# Patient Record
Sex: FEMALE | Race: WHITE | NOT HISPANIC OR LATINO | Employment: FULL TIME | URBAN - METROPOLITAN AREA
[De-identification: names, ages, dates, MRNs, and addresses within clinical notes are randomized per-mention and may not be internally consistent; named-entity substitution may affect disease eponyms.]

---

## 2020-12-16 ENCOUNTER — TELEMEDICINE (OUTPATIENT)
Dept: GASTROENTEROLOGY | Facility: CLINIC | Age: 25
End: 2020-12-16
Payer: COMMERCIAL

## 2020-12-16 VITALS — HEIGHT: 66 IN | WEIGHT: 183 LBS | BODY MASS INDEX: 29.41 KG/M2

## 2020-12-16 DIAGNOSIS — R19.7 DIARRHEA, UNSPECIFIED TYPE: ICD-10-CM

## 2020-12-16 DIAGNOSIS — K62.5 RECTAL BLEEDING: Primary | ICD-10-CM

## 2020-12-16 PROCEDURE — 99204 OFFICE O/P NEW MOD 45 MIN: CPT | Performed by: INTERNAL MEDICINE

## 2020-12-16 RX ORDER — NORETHINDRONE ACETATE AND ETHINYL ESTRADIOL 1; .02 MG/1; MG/1
1 TABLET ORAL DAILY
COMMUNITY
End: 2021-06-17 | Stop reason: SDUPTHER

## 2020-12-16 RX ORDER — SUMATRIPTAN 50 MG/1
50 TABLET, FILM COATED ORAL ONCE AS NEEDED
COMMUNITY

## 2020-12-16 RX ORDER — TOPIRAMATE 25 MG/1
25 CAPSULE, COATED PELLETS ORAL 2 TIMES DAILY
COMMUNITY

## 2020-12-16 RX ORDER — CLOTRIMAZOLE AND BETAMETHASONE DIPROPIONATE 10; .64 MG/G; MG/G
1 CREAM TOPICAL 2 TIMES DAILY
COMMUNITY

## 2020-12-16 RX ORDER — VALACYCLOVIR HYDROCHLORIDE 1 G/1
1000 TABLET, FILM COATED ORAL 2 TIMES DAILY
COMMUNITY
End: 2021-06-17 | Stop reason: SDUPTHER

## 2021-01-14 ENCOUNTER — OFFICE VISIT (OUTPATIENT)
Dept: GASTROENTEROLOGY | Facility: CLINIC | Age: 26
End: 2021-01-14
Payer: COMMERCIAL

## 2021-01-14 VITALS
SYSTOLIC BLOOD PRESSURE: 136 MMHG | HEIGHT: 66 IN | BODY MASS INDEX: 29.77 KG/M2 | HEART RATE: 84 BPM | DIASTOLIC BLOOD PRESSURE: 86 MMHG | WEIGHT: 185.2 LBS

## 2021-01-14 DIAGNOSIS — K62.5 RECTAL BLEEDING: ICD-10-CM

## 2021-01-14 DIAGNOSIS — R19.7 DIARRHEA, UNSPECIFIED TYPE: Primary | ICD-10-CM

## 2021-01-14 PROCEDURE — 99213 OFFICE O/P EST LOW 20 MIN: CPT | Performed by: NURSE PRACTITIONER

## 2021-01-14 NOTE — PATIENT INSTRUCTIONS
Keep colonoscopy appointment scheduled for 2/12  Low fiber/low residue diet for now   Trial fiber gummies (start with 1 daily and then increase to 2 daily     Follow up in 6-8 weeks     Low Fiber Diet   WHAT YOU NEED TO KNOW:   A low-fiber diet limits foods that are high in fiber  Fiber is the part of fruits, vegetables, and grains that is not broken down by your body  You may need to follow this diet after surgery on your intestines  You may also need to follow this diet for certain conditions such as Crohn disease or ulcerative colitis  Ask your healthcare provider or dietitian how much fiber you can have each day  DISCHARGE INSTRUCTIONS:   Foods to include:  Read food labels to check the amount of fiber that are found in foods  Some foods that are low in fiber include the following:  · Grains:  Choose grains that have less than 2 grams of fiber in each serving  Examples include the following:     ? Cream of wheat and finely ground grits    ? Dry cereal made from rice     ? White bread, white pasta, and white rice    ? Crackers, bagels, and rolls made from white or refined flour    · Other foods:      ? Canned and well-cooked fruit without skins or seeds, and juice without pulp    ? Ripe bananas and melons    ? Canned and well-cooked vegetables without skins or seeds, and vegetable juice    ? Cow's milk, lactose-free milk, soy milk, and rice milk    ? Yogurt without nuts, fruit, or granola    ? Eggs, poultry (such as chicken and turkey), fish, and tender, ground, well-cooked beef     ? Tofu and smooth peanut butter    ?  Broth and strained soups made of low-fiber foods    Foods to avoid:   · Breads, cereals, crackers, and pasta made with whole wheat or whole grains (such as whole oats)    · Cro[place] [employer] [place], wild rice, quinoa, kasha, and barley    · All fresh fruit with skin, except banana and melons     · Dried fruits and fruit juice with pulp    · Canned pineapple    · Raw vegetables    · Nuts, seeds, and popcorn    · Beans, nuts, peas, and lentils    · Tough meats    · Coconut and avocado    What else you should know about a low-fiber diet:  A low-fiber diet can decrease the amount of bowel movements you have  Drink liquids as directed to avoid constipation  Ask how much liquid to drink each day and which liquids are best for you  © Copyright [address] Information is for End User's use only and may not be sold, redistributed or otherwise used for commercial purposes  All illustrations and images included in CareNotes® are the copyrighted property of [employer]  or [address] [place] [address]  The above information is an  only  It is not intended as medical advice for individual conditions or treatments  Talk to your doctor, nurse or pharmacist before following any medical regimen to see if it is safe and effective for you     if able to tolerate)

## 2021-01-14 NOTE — H&P (VIEW-ONLY)
[id number] - Outpatient Follow-up Note  Mark Andino 31 y o  female MRN: [id number]  Encounter: [id number]    ASSESSMENT AND PLAN:      1  Diarrhea, unspecified type  2  Rectal bleeding  Patient reports an improvement of diarrhea and rectal bleeding  She did have 1 episode of diarrhea over the weekend but reports that she has had intermittent constipation  Associated with occasional bloating and mild lower abdominal pain  Differential diagnoses include colitis, hemorrhoidal bleeding, IBD  - proceed with colonoscopy as scheduled on 02/12/2021  - low-fiber, low residue/bland diet for now  - stay well hydrated  - can trial fiber gummies 1-2 daily     Followup Appointment: 2 months   ______________________________________________________________________    Chief Complaint   Patient presents with    Follow up diarrhea     HPI:  Mark Andino is a 31y o  year old female who returns to the office today for follow-up of her diarrhea and rectal bleeding  Patient was last seen for virtual visit in the middle of December after an acute onset of diarrhea and rectal bleeding  Patient reports that she had multiple episodes of diarrhea, lower abdominal pain with clots  She notes that since that time she has only experienced 1 episode of diarrhea that occurred over this past weekend  She denies any further episodes of rectal bleeding  She notes that she is moving her bowels every other day and reports that occasionally they are soft and formed but will also sometimes be hard in consistency  Associated with mild lower abdominal pain and cramping and occasional intermittent bloating  She denies any fever, chills, upper GI symptoms, nausea, vomiting, hematochezia or melena  Her appetite and weight are stable  Historical Information   Past Medical History:   Diagnosis Date    Irritable bowel syndrome     Migraines      History reviewed   No pertinent surgical history  Social History     Substance and Sexual Activity   Alcohol Use Yes    Frequency: 2-4 times a month    Comment: social     Social History     Substance and Sexual Activity   Drug Use Not on file     Social History     Tobacco Use   Smoking Status Never Smoker   Smokeless Tobacco Never Used     Family History   Problem Relation Age of Onset    Hypertension Mother     Melanoma Mother     Cancer Maternal Grandfather     Cancer Maternal Aunt     Colon polyps Maternal Aunt 28        polyposis; 68 polyps removed; hx adenomatous polyps    Cancer Other 82        rectal cancer    Colon cancer Other 65         Current Outpatient Medications:     clotrimazole-betamethasone (LOTRISONE) 1-0 05 % cream    norethindrone-ethinyl estradiol (MICROGESTIN 1/20) 1-20 MG-MCG per tablet    SUMAtriptan (IMITREX) 50 mg tablet    topiramate (TOPAMAX) 25 mg sprinkle capsule    valACYclovir (VALTREX) 1,000 mg tablet  No Known Allergies  Reviewed medications and allergies and updated as indicated    PHYSICAL EXAM:    Blood pressure 136/86, pulse 84, height 5' 6" (1 676 m), weight 84 kg (185 lb 3 2 oz)  Body mass index is 29 89 kg/m²  General Appearance: NAD, cooperative, alert  Eyes: Anicteric, PERRLA, EOMI  ENT:  Normocephalic, atraumatic, normal mucosa  Neck:  Supple, symmetrical, trachea midline  Resp:  Clear to auscultation bilaterally; no rales, rhonchi or wheezing; respirations unlabored   CV:  S1 S2, Regular rate and rhythm; no murmur, rub, or gallop  GI:  Soft, +right and left lower quadrant abdominal ttp, no guarding or rebound, normal bowel sounds; no masses, no organomegaly   Rectal: Deferred  Musculoskeletal: No cyanosis, clubbing or edema  Normal ROM    Skin:  No jaundice, rashes, or lesions   Heme/Lymph: No palpable cervical lymphadenopathy  Psych: Normal affect, good eye contact  Neuro: No gross deficits, AAOx3    Lab Results:   No results found for: WBC, HGB, HCT, MCV, PLT  No results found for: NA, K, CL, CO2, ANIONGAP, BUN, CREATININE, GLUCOSE, GLUF, CALCIUM, CORRECTEDCA, AST, ALT, ALKPHOS, PROT, BILITOT, EGFR  No results found for: IRON, TIBC, FERRITIN  No results found for: LIPASE    Radiology Results:   No results found

## 2021-02-05 ENCOUNTER — TELEMEDICINE (OUTPATIENT)
Dept: GASTROENTEROLOGY | Facility: CLINIC | Age: 26
End: 2021-02-05

## 2021-02-05 VITALS — BODY MASS INDEX: 28.93 KG/M2 | WEIGHT: 180 LBS | HEIGHT: 66 IN

## 2021-02-05 DIAGNOSIS — R19.7 DIARRHEA, UNSPECIFIED TYPE: ICD-10-CM

## 2021-02-05 DIAGNOSIS — K62.5 RECTAL BLEEDING: Primary | ICD-10-CM

## 2021-02-05 RX ORDER — SODIUM PICOSULFATE, MAGNESIUM OXIDE, AND ANHYDROUS CITRIC ACID 10; 3.5; 12 MG/160ML; G/160ML; G/160ML
LIQUID ORAL
Qty: 1 BOTTLE | Refills: 0 | Status: SHIPPED | OUTPATIENT
Start: 2021-02-05

## 2021-02-05 NOTE — PROGRESS NOTES
Why does your doctor want you to have this procedure? Rectal bleeding, diarrhea, fam  Hx of polyps    Do you have kidney disease?  no  If yes, are you on dialysis :     Have you had diverticulitis within the past 2 months? no    Are you diabetic?  no  If yes, insulin dependent: If yes, provide diabetic instructions sheet     Do take iron supplements?  no  If yes, instruct patient to hold iron supplement for 7 days prior    Are you on a blood thinner? no   Was the blood thinner sheet complete and faxed to cardiologist no  Plavix (clopidogrel), Coumadin (warfarin), Lovenox (enoxaparin), Xarelto (rivaroxaban), Pradaxa(dabigatran), Eliquis(apixaban) Savaysa/Lixiana (edoxapan)    Do you have an automatic implantable cardiac defibrillator (AICD)/pacemaker ([facility])? no  Was AICD/pacemaker sheet completed and faxed to cardiologist? no    Are you on home oxygen? no  If yes, continuous or nocturnal:     Have you been treated for MRSA, VRE or any communicable diseases? no    Heart attack, stroke, or stent within 3 months? no  Schedule at Hospital if within 3-6 months   Use nitroglycerin for chest pain in the last 6 months? no    History of organ  transplant?  no   If yes, notify Endo      History of neck/throat/tongue surgery or cancer? no  IF yes, notify Endo      Any problems with anesthesia in the past? no     Was stool C diff ordered?  no Stool specimen needs to be completed prior to procedure    Do have any facial or body piercings?no     Do you have a latex allergy? no     Do have an allergy to metals? (Bravo study only) no     If pediatric patient, was consent faxed to pediatrician no     Patient rights reviewed yes    Rx Clenpiq sent to provider for signature  Instructions emailed to patient

## 2021-02-12 ENCOUNTER — ANESTHESIA (OUTPATIENT)
Dept: GASTROENTEROLOGY | Facility: AMBULATORY SURGERY CENTER | Age: 26
End: 2021-02-12

## 2021-02-12 ENCOUNTER — ANESTHESIA EVENT (OUTPATIENT)
Dept: GASTROENTEROLOGY | Facility: AMBULATORY SURGERY CENTER | Age: 26
End: 2021-02-12

## 2021-02-12 ENCOUNTER — HOSPITAL ENCOUNTER (OUTPATIENT)
Dept: GASTROENTEROLOGY | Facility: AMBULATORY SURGERY CENTER | Age: 26
Discharge: HOME | End: 2021-02-12
Payer: COMMERCIAL

## 2021-02-12 VITALS
TEMPERATURE: 97.8 F | SYSTOLIC BLOOD PRESSURE: 94 MMHG | HEART RATE: 76 BPM | DIASTOLIC BLOOD PRESSURE: 56 MMHG | RESPIRATION RATE: 15 BRPM | OXYGEN SATURATION: 100 %

## 2021-02-12 VITALS — HEART RATE: 69 BPM

## 2021-02-12 DIAGNOSIS — K62.5 RECTAL BLEEDING: ICD-10-CM

## 2021-02-12 DIAGNOSIS — R19.7 DIARRHEA, UNSPECIFIED TYPE: ICD-10-CM

## 2021-02-12 LAB
EXT PREGNANCY TEST URINE: NEGATIVE
EXT. CONTROL: NORMAL

## 2021-02-12 PROCEDURE — 88305 TISSUE EXAM BY PATHOLOGIST: CPT | Performed by: PATHOLOGY

## 2021-02-12 PROCEDURE — 45380 COLONOSCOPY AND BIOPSY: CPT | Performed by: INTERNAL MEDICINE

## 2021-02-12 RX ORDER — PROPOFOL 10 MG/ML
INJECTION, EMULSION INTRAVENOUS AS NEEDED
Status: DISCONTINUED | OUTPATIENT
Start: 2021-02-12 | End: 2021-02-12

## 2021-02-12 RX ORDER — SODIUM CHLORIDE 9 MG/ML
50 INJECTION, SOLUTION INTRAVENOUS CONTINUOUS
Status: DISCONTINUED | OUTPATIENT
Start: 2021-02-12 | End: 2021-02-16 | Stop reason: HOSPADM

## 2021-02-12 RX ADMIN — PROPOFOL 100 MG: 10 INJECTION, EMULSION INTRAVENOUS at 08:36

## 2021-02-12 RX ADMIN — SODIUM CHLORIDE 50 ML/HR: 9 INJECTION, SOLUTION INTRAVENOUS at 08:19

## 2021-02-12 RX ADMIN — PROPOFOL 200 MG: 10 INJECTION, EMULSION INTRAVENOUS at 08:27

## 2021-02-12 RX ADMIN — PROPOFOL 50 MG: 10 INJECTION, EMULSION INTRAVENOUS at 08:43

## 2021-02-12 RX ADMIN — SODIUM CHLORIDE: 9 INJECTION, SOLUTION INTRAVENOUS at 08:27

## 2021-02-12 NOTE — ANESTHESIA PREPROCEDURE EVALUATION
Procedure:  COLONOSCOPY    Relevant Problems   No relevant active problems        Physical Exam    Airway    Mallampati score: I  TM Distance: >3 FB  Neck ROM: full     Dental       Cardiovascular  Cardiovascular exam normal    Pulmonary  Pulmonary exam normal     Other Findings        Anesthesia Plan  ASA Score- 2     Anesthesia Type- IV sedation with anesthesia with ASA Monitors  Additional Monitors:   Airway Plan:           Plan Factors-Exercise tolerance (METS): >4 METS  Chart reviewed  Induction- intravenous  Postoperative Plan-     Informed Consent- Anesthetic plan and risks discussed with patient

## 2021-02-12 NOTE — ANESTHESIA POSTPROCEDURE EVALUATION
Post-Op Assessment Note    CV Status:  Stable  Pain Score: 0    Pain management: adequate     Mental Status:  Sleepy   Hydration Status:  Euvolemic   PONV Controlled:  Controlled   Airway Patency:  Patent       Post Op Vitals Reviewed: No      Staff: Anesthesiologist         No complications documented      BP      Temp      Pulse     Resp     SpO2

## 2021-02-12 NOTE — INTERVAL H&P NOTE
H&P reviewed  After examining the patient I find no changes in the patients condition since the H&P had been written      Vitals:    02/12/21 0743   BP: 108/64   Pulse: 70   Resp: (!) 28   Temp: 97 8 °F (36 6 °C)   SpO2: 99%

## 2021-03-05 ENCOUNTER — OFFICE VISIT (OUTPATIENT)
Dept: GASTROENTEROLOGY | Facility: CLINIC | Age: 26
End: 2021-03-05
Payer: COMMERCIAL

## 2021-03-05 VITALS
SYSTOLIC BLOOD PRESSURE: 122 MMHG | DIASTOLIC BLOOD PRESSURE: 88 MMHG | HEIGHT: 66 IN | WEIGHT: 187 LBS | HEART RATE: 64 BPM | BODY MASS INDEX: 30.05 KG/M2

## 2021-03-05 DIAGNOSIS — K58.0 IRRITABLE BOWEL SYNDROME WITH DIARRHEA: ICD-10-CM

## 2021-03-05 DIAGNOSIS — R19.7 DIARRHEA, UNSPECIFIED TYPE: Primary | ICD-10-CM

## 2021-03-05 DIAGNOSIS — Z83.71 FAMILY HISTORY OF COLONIC POLYPS: ICD-10-CM

## 2021-03-05 PROCEDURE — 99213 OFFICE O/P EST LOW 20 MIN: CPT | Performed by: INTERNAL MEDICINE

## 2021-03-05 NOTE — LETTER
March 5, 2021     Marychuy Porter  [address] [employer]    Patient: Fam Brown   YOB: 1995   Date of Visit: 3/5/2021       Dear Dr Tristan Connors: Thank you for referring Fam Kevin to me for evaluation  Below are my notes for this consultation  If you have questions, please do not hesitate to call me  I look forward to following your patient along with you  Sincerely,        Danika Hein MD        CC: No Recipients  Danika Hein MD  3/5/2021  4:55 PM  Sign when Signing Visit  [id number] - Outpatient Follow-up Note  aFm Brown 31 y o  female MRN: [id number]  Encounter: [id number]    ASSESSMENT AND PLAN:      1  Diarrhea, unspecified type  2  Irritable bowel syndrome with diarrhea  Diarrhea bloating and discomfort all resolved with fiber supplementation  Colonoscopy was negative for any sign of inflammatory bowel disease or microscopic colitis  Presentation most consistent with diarrhea predominant irritable bowel  Patient has responded nicely to fiber supplementation  Discussed high-fiber diet but also the importance of a regular dosing such as psyllium supplement along with plenty of fluids  No additional interventions needed  ·  Continuing to encourage high-fiber diet and plenty of fluids  ·  Continue with daily dosing of fiber supplement via fiber gummies, 8 g daily  ·  Call if symptoms return    3  Family history of colonic polyps  Family history of colonic neoplasia  Patient please both parents have had polyps in the past   Recommend screening colonoscopy every  5 years starting at age 35  Followup Appointment:   One year sooner if needed  ______________________________________________________________________    Chief Complaint   Patient presents with    Follow-up     from colonoscopy     HPI:  Stools normal   Taking fiber gummies  Taking about 8 gm of fiber daily  Gassiness and bloating improved   No bleeding  Appetite good and weight stable  No GERD or dyspepsia  Historical Information   Past Medical History:   Diagnosis Date    Irritable bowel syndrome     Migraines      Past Surgical History:   Procedure Laterality Date    COLONOSCOPY      December 2020 normal colonoscopy including terminal ileum  Biopsies negative for microscopic colitis   FRACTURE SURGERY Right     arm     Social History     Substance and Sexual Activity   Alcohol Use Yes    Frequency: 2-3 times a week    Comment: social     Social History     Substance and Sexual Activity   Drug Use Never     Social History     Tobacco Use   Smoking Status Never Smoker   Smokeless Tobacco Never Used     Family History   Problem Relation Age of Onset    Hypertension Mother     Melanoma Mother     Colon polyps Mother     Cancer Maternal Grandfather     Cancer Maternal Aunt     Colon polyps Maternal Aunt 28        polyposis; 68 polyps removed; hx adenomatous polyps    Colon polyps Father     Cancer Other 80        rectal cancer    Colon cancer Other 72         Current Outpatient Medications:     clotrimazole-betamethasone (LOTRISONE) 1-0 05 % cream    FIBER ADULT GUMMIES PO    norethindrone-ethinyl estradiol (MICROGESTIN 1/20) 1-20 MG-MCG per tablet    SUMAtriptan (IMITREX) 50 mg tablet    topiramate (TOPAMAX) 25 mg sprinkle capsule    valACYclovir (VALTREX) 1,000 mg tablet    Sod Picosulfate-Mag Ox-Cit Acd (Clenpiq) 10-3 5-12 MG-GM -GM/160ML SOLN  No Known Allergies  Reviewed medications and allergies and updated as indicated    PHYSICAL EXAM:    Blood pressure 122/88, pulse 64, height 5' 6" (1 676 m), weight 84 8 kg (187 lb)  Body mass index is 30 18 kg/m²  General Appearance: NAD, cooperative, alert  Eyes: Anicteric, PERRLA, EOMI  ENT:  Normocephalic, atraumatic, normal mucosa      Neck:  Supple, symmetrical, trachea midline  Resp:  Clear to auscultation bilaterally; no rales, rhonchi or wheezing; respirations unlabored   CV: S1 S2, Regular rate and rhythm; no murmur, rub, or gallop  GI:  Soft, non-tender, non-distended; normal bowel sounds; no masses, no organomegaly   Rectal: Deferred  Musculoskeletal: No cyanosis, clubbing or edema  Normal ROM  Skin:  No jaundice, rashes, or lesions   Heme/Lymph: No palpable cervical lymphadenopathy  Psych: Normal affect, good eye contact  Neuro: No gross deficits, AAOx3    Lab Results:   No results found for: WBC, HGB, HCT, MCV, PLT  No results found for: NA, K, CL, CO2, ANIONGAP, BUN, CREATININE, GLUCOSE, GLUF, CALCIUM, CORRECTEDCA, AST, ALT, ALKPHOS, PROT, BILITOT, EGFR  No results found for: IRON, TIBC, FERRITIN  No results found for: LIPASE    Radiology Results:   No results found

## 2021-03-05 NOTE — PATIENT INSTRUCTIONS
Continue with high-fiber diet and encourage plenty of fluids  Continue with fiber gummies, 8 g daily  Call if symptoms worsen  Repeat screening colonoscopy at age 35 due to parents with history of colonic polyps

## 2021-03-05 NOTE — PROGRESS NOTES
[email or web address] - Outpatient Follow-up Note  Melissa Found 31 y o  female MRN: [id number]  Encounter: [id number]    ASSESSMENT AND PLAN:      1  Diarrhea, unspecified type  2  Irritable bowel syndrome with diarrhea  Diarrhea bloating and discomfort all resolved with fiber supplementation  Colonoscopy was negative for any sign of inflammatory bowel disease or microscopic colitis  Presentation most consistent with diarrhea predominant irritable bowel  Patient has responded nicely to fiber supplementation  Discussed high-fiber diet but also the importance of a regular dosing such as psyllium supplement along with plenty of fluids  No additional interventions needed  ·  Continuing to encourage high-fiber diet and plenty of fluids  ·  Continue with daily dosing of fiber supplement via fiber gummies, 8 g daily  ·  Call if symptoms return    3  Family history of colonic polyps  Family history of colonic neoplasia  Patient please both parents have had polyps in the past   Recommend screening colonoscopy every  5 years starting at age 35  Followup Appointment:   One year sooner if needed  ______________________________________________________________________    Chief Complaint   Patient presents with    Follow-up     from colonoscopy     HPI:  Stools normal   Taking fiber gummies  Taking about 8 gm of fiber daily  Gassiness and bloating improved  No bleeding  Appetite good and weight stable  No GERD or dyspepsia  Historical Information   Past Medical History:   Diagnosis Date    Irritable bowel syndrome     Migraines      Past Surgical History:   Procedure Laterality Date    COLONOSCOPY      December 2020 normal colonoscopy including terminal ileum  Biopsies negative for microscopic colitis      FRACTURE SURGERY Right     arm     Social History     Substance and Sexual Activity   Alcohol Use Yes    Frequency: 2-3 times a week    Comment: social     Social History Substance and Sexual Activity   Drug Use Never     Social History     Tobacco Use   Smoking Status Never Smoker   Smokeless Tobacco Never Used     Family History   Problem Relation Age of Onset    Hypertension Mother     Melanoma Mother     Colon polyps Mother     Cancer Maternal Grandfather     Cancer Maternal Aunt     Colon polyps Maternal Aunt 28        polyposis; 68 polyps removed; hx adenomatous polyps    Colon polyps Father     Cancer Other 80        rectal cancer    Colon cancer Other 72         Current Outpatient Medications:     clotrimazole-betamethasone (LOTRISONE) 1-0 05 % cream    FIBER ADULT GUMMIES PO    norethindrone-ethinyl estradiol (MICROGESTIN 1/20) 1-20 MG-MCG per tablet    SUMAtriptan (IMITREX) 50 mg tablet    topiramate (TOPAMAX) 25 mg sprinkle capsule    valACYclovir (VALTREX) 1,000 mg tablet    Sod Picosulfate-Mag Ox-Cit Acd (Clenpiq) 10-3 5-12 MG-GM -GM/160ML SOLN  No Known Allergies  Reviewed medications and allergies and updated as indicated    PHYSICAL EXAM:    Blood pressure 122/88, pulse 64, height 5' 6" (1 676 m), weight 84 8 kg (187 lb)  Body mass index is 30 18 kg/m²  General Appearance: NAD, cooperative, alert  Eyes: Anicteric, PERRLA, EOMI  ENT:  Normocephalic, atraumatic, normal mucosa  Neck:  Supple, symmetrical, trachea midline  Resp:  Clear to auscultation bilaterally; no rales, rhonchi or wheezing; respirations unlabored   CV:  S1 S2, Regular rate and rhythm; no murmur, rub, or gallop  GI:  Soft, non-tender, non-distended; normal bowel sounds; no masses, no organomegaly   Rectal: Deferred  Musculoskeletal: No cyanosis, clubbing or edema  Normal ROM    Skin:  No jaundice, rashes, or lesions   Heme/Lymph: No palpable cervical lymphadenopathy  Psych: Normal affect, good eye contact  Neuro: No gross deficits, AAOx3    Lab Results:   No results found for: WBC, HGB, HCT, MCV, PLT  No results found for: NA, K, CL, CO2, ANIONGAP, BUN, CREATININE, GLUCOSE, GLUF, CALCIUM, CORRECTEDCA, AST, ALT, ALKPHOS, PROT, BILITOT, EGFR  No results found for: IRON, TIBC, FERRITIN  No results found for: LIPASE    Radiology Results:   No results found

## 2021-06-17 ENCOUNTER — ANNUAL EXAM (OUTPATIENT)
Dept: OBGYN CLINIC | Facility: CLINIC | Age: 26
End: 2021-06-17
Payer: COMMERCIAL

## 2021-06-17 VITALS
HEIGHT: 66 IN | SYSTOLIC BLOOD PRESSURE: 120 MMHG | BODY MASS INDEX: 30.22 KG/M2 | DIASTOLIC BLOOD PRESSURE: 80 MMHG | WEIGHT: 188 LBS

## 2021-06-17 DIAGNOSIS — Z11.3 ROUTINE SCREENING FOR STI (SEXUALLY TRANSMITTED INFECTION): ICD-10-CM

## 2021-06-17 DIAGNOSIS — B00.9 HSV (HERPES SIMPLEX VIRUS) INFECTION: ICD-10-CM

## 2021-06-17 DIAGNOSIS — Z30.41 ENCOUNTER FOR SURVEILLANCE OF CONTRACEPTIVE PILLS: ICD-10-CM

## 2021-06-17 DIAGNOSIS — Z01.419 ROUTINE GYNECOLOGICAL EXAMINATION: Primary | ICD-10-CM

## 2021-06-17 PROCEDURE — 99395 PREV VISIT EST AGE 18-39: CPT | Performed by: OBSTETRICS & GYNECOLOGY

## 2021-06-17 RX ORDER — NORETHINDRONE ACETATE AND ETHINYL ESTRADIOL 1; .02 MG/1; MG/1
1 TABLET ORAL DAILY
Qty: 90 TABLET | Refills: 3 | Status: SHIPPED | OUTPATIENT
Start: 2021-06-17 | End: 2022-06-17

## 2021-06-17 RX ORDER — VALACYCLOVIR HYDROCHLORIDE 1 G/1
1000 TABLET, FILM COATED ORAL 2 TIMES DAILY
Qty: 2 TABLET | Refills: 4 | Status: SHIPPED | OUTPATIENT
Start: 2021-06-17 | End: 2021-06-18

## 2021-06-17 NOTE — PATIENT INSTRUCTIONS
Stop the pill report  Cycles  Less than 21  D from day one to day one  No period in 3 mo  Or if it lasts  Greater than 10 d  I encouraged  Weight watchers  and exercise  If you wish to restart the pill  do on day one of your menses

## 2021-06-17 NOTE — PROGRESS NOTES
[address], [facility]    ASSESSMENT/PLAN: Rani Narayanan is a 31 y o  [place] Grise who presents for annual gynecologic exam     Encounter for routine gynecologic examination  - Routine well woman exam completed today  - HPV Vaccination status  Pt  Not sure   - STI screening offered including HIV testing: today culture  Declines blood work   - Contraceptive counseling discussed  Current contraception: none or condoms:     Additional problems addressed during this visit:  1  Routine gynecological examination    2  BMI 30 0-30 9,adult    3  Encounter for surveillance of contraceptive pills  -     norethindrone-ethinyl estradiol (MICROGESTIN 1/20) 1-20 MG-MCG per tablet; Take 1 tablet by mouth daily    4  Routine screening for STI (sexually transmitted infection)  -     Chlamydia/GC amplified DNA by PCR    5  HSV (herpes simplex virus) infection  -     valACYclovir (VALTREX) 1,000 mg tablet; Take 1 tablet (1,000 mg total) by mouth 2 (two) times a day for 1 day    Pt not currently sexually active  Dw pt condom use  Report cycles less than 21 d  Lasting greater than 10 d or no menses in 3 mo  Motrin 600 mg every 6 hours while  Awake    No increase in  [place]ri   CC:  Annual Gynecologic Examination    HPI: Rani Narayanan is a 31 y o  [place] Grise who presents for annual gynecologic examination  Cycles monthly  Not  Currently sexually active  Would like to stop  Pill    + wt gain  dw  Pt  [employer] and  Exercise    Would like  Vaginal culture   Declines   Blood sti screening      The following portions of the patient's history were reviewed and updated as appropriate: She  has a past medical history of Anxiety, Insomnia, Iron deficiency anemia, Irritable bowel syndrome, Migraines, Papanicolaou smear (2019), and Wears glasses  She  has a past surgical history that includes Colonoscopy; [place] tooth extraction; and Fracture surgery (Right)    Her family history includes Brain cancer in her other; Breast cancer (age of onset: 46) in her paternal aunt; Cancer in her maternal aunt and maternal grandfather; Cancer (age of onset: 80) in her other; Colon cancer (age of onset: 72) in her other; Colon polyps in her father and mother; Colon polyps (age of onset: 29) in her maternal aunt; Hypertension in her mother; Melanoma in her mother  She  reports that she has never smoked  She has never used smokeless tobacco  She reports current alcohol use  She reports that she does not use drugs  Current Outpatient Medications   Medication Sig Dispense Refill    clotrimazole-betamethasone (LOTRISONE) 1-0 05 % cream Apply 1 application topically 2 (two) times a day      FIBER ADULT GUMMIES PO Take 4 g by mouth daily      norethindrone-ethinyl estradiol (MICROGESTIN 1/20) 1-20 MG-MCG per tablet Take 1 tablet by mouth daily 90 tablet 3    SUMAtriptan (IMITREX) 50 mg tablet Take 50 mg by mouth once as needed      topiramate (TOPAMAX) 25 mg sprinkle capsule Take 25 mg by mouth 2 (two) times a day      valACYclovir (VALTREX) 1,000 mg tablet Take 1 tablet (1,000 mg total) by mouth 2 (two) times a day for 1 day 2 tablet 4    Sod Picosulfate-Mag Ox-Cit Acd (Clenpiq) 10-3 5-12 MG-GM -GM/160ML SOLN As directed (1 kit) (Patient not taking: Reported on 3/5/2021) 1 Bottle 0     No current facility-administered medications for this visit  She has No Known Allergies       Review of Systems   Constitutional: Negative for chills and fever  HENT: Negative for ear pain and sore throat  Eyes: Negative for pain and visual disturbance  Respiratory: Negative for cough and shortness of breath  Cardiovascular: Negative for chest pain and palpitations  Gastrointestinal: Negative for abdominal pain and vomiting  Genitourinary: Negative for dysuria and hematuria  Musculoskeletal: Negative for arthralgias and back pain  Skin: Negative for color change and rash     Neurological: Negative for seizures and syncope  All other systems reviewed and are negative  Objective:  /80   Ht 5' 5 5" (1 664 m)   Wt 85 3 kg (188 lb)   BMI 30 81 kg/m²    Physical Exam  Vitals and nursing note reviewed  Constitutional:       Appearance: Normal appearance  HENT:      Head: Normocephalic  Cardiovascular:      Rate and Rhythm: Normal rate and regular rhythm  Pulmonary:      Effort: Pulmonary effort is normal       Breath sounds: Normal breath sounds  Chest:      Chest wall: No mass, lacerations, swelling, tenderness or edema  Breasts: Wolf Score is 4  Breasts are symmetrical          Right: Normal  No swelling, bleeding, inverted nipple, mass, nipple discharge, skin change or tenderness  Left: No swelling, bleeding, inverted nipple, mass, nipple discharge, skin change or tenderness  Abdominal:      General: Abdomen is flat  Bowel sounds are normal       Palpations: Abdomen is soft  Genitourinary:     General: Normal vulva  Exam position: Lithotomy position  Pubic Area: No rash  Wolf stage (genital): 4       Labia:         Right: No rash, tenderness or lesion  Left: No rash, tenderness or lesion  Urethra: No urethral pain, urethral swelling or urethral lesion  Vagina: Normal       Cervix: No cervical motion tenderness or discharge  Uterus: Normal        Adnexa: Right adnexa normal and left adnexa normal       Rectum: Normal    Musculoskeletal:         General: Normal range of motion  Cervical back: Neck supple  Lymphadenopathy:      Upper Body:      Right upper body: No supraclavicular, axillary or pectoral adenopathy  Left upper body: No supraclavicular, axillary or pectoral adenopathy  Lower Body: No right inguinal adenopathy  No left inguinal adenopathy  Skin:     General: Skin is warm and dry  Neurological:      Mental Status: She is alert     Psychiatric:         Mood and Affect: Mood normal

## 2021-06-18 LAB
C TRACH RRNA SPEC QL NAA+PROBE: NEGATIVE
N GONORRHOEA RRNA SPEC QL NAA+PROBE: NEGATIVE

## 2021-09-08 ENCOUNTER — VBI (OUTPATIENT)
Dept: ADMINISTRATIVE | Facility: OTHER | Age: 26
End: 2021-09-08

## 2021-09-08 NOTE — TELEPHONE ENCOUNTER
Upon review of the In Basket request we were able to locate, review, and update the patient chart as requested for Pap Smear (HPV) aka Cervical Cancer Screening  Any additional questions or concerns should be emailed to the Practice Liaisons via [email or web address] email, please do not reply via In Basket      Thank you  Ulises Feliz

## 2021-10-06 ENCOUNTER — EMERGENCY (EMERGENCY)
Facility: HOSPITAL | Age: 26
LOS: 1 days | Discharge: HOME | End: 2021-10-06
Admitting: EMERGENCY MEDICINE
Payer: COMMERCIAL

## 2021-10-06 VITALS
TEMPERATURE: 98 F | HEART RATE: 87 BPM | RESPIRATION RATE: 17 BRPM | OXYGEN SATURATION: 100 % | DIASTOLIC BLOOD PRESSURE: 86 MMHG | SYSTOLIC BLOOD PRESSURE: 133 MMHG
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
## 2021-10-06 DIAGNOSIS — S61.211A LACERATION WITHOUT FOREIGN BODY OF LEFT INDEX FINGER WITHOUT DAMAGE TO NAIL, INITIAL ENCOUNTER: ICD-10-CM

## 2021-10-06 DIAGNOSIS — S66.321A LACERATION OF EXTENSOR MUSCLE, FASCIA AND TENDON OF LEFT INDEX FINGER AT WRIST AND HAND LEVEL, INITIAL ENCOUNTER: ICD-10-CM
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
## 2021-10-06 PROCEDURE — 99284 EMERGENCY DEPT VISIT MOD MDM: CPT | Mod: 25

## 2021-10-06 PROCEDURE — 73140 X-RAY EXAM OF FINGER(S): CPT

## 2021-10-06 PROCEDURE — 73140 X-RAY EXAM OF FINGER(S): CPT | Mod: 26

## 2021-10-06 PROCEDURE — 12042 INTMD RPR N-HF/GENIT2.6-7.5: CPT

## 2021-10-06 PROCEDURE — 73140 X-RAY EXAM OF FINGER(S): CPT | Mod: 26,LT

## 2021-10-06 PROCEDURE — 96374 THER/PROPH/DIAG INJ IV PUSH: CPT | Mod: XU

## 2021-10-06 RX ORDER — CEFAZOLIN SODIUM 1 G
1000 VIAL (EA) INJECTION ONCE
Refills: 0 | Status: COMPLETED | OUTPATIENT
Start: 2021-10-06 | End: 2021-10-06

## 2021-10-06 RX ADMIN — Medication 100 MILLIGRAM(S): at 23:27

## 2021-10-06 NOTE — ED PROVIDER NOTE - CLINICAL SUMMARY MEDICAL DECISION MAKING FREE TEXT BOX
Hand surgery (ortho consult) called for partial extensor tendon laceration; consultant repaired laceration and splinted pt.  To be DC on Keflex, to follow up with Dr Lorenzo next week.

## 2021-10-06 NOTE — ED PROVIDER NOTE - OBJECTIVE STATEMENT
26 y/o F pt who is L hand dominant presents to ED c/o laceration to L index finger. She was washing a wine glass at home about 1hr earlier when the glass broke, causing a laceration to the dorsal aspect of her L index finger. The bleeding was controlled, and pt denies numbness, tingling, or foreign body sensation. Her last Tdap was this year.

## 2021-10-06 NOTE — ED PROVIDER NOTE - PHYSICAL EXAMINATION
GEN: NAD  Skin: Normal color and turgor.   MSK: 2.5cm linear laceration over L index finger proximal phalanx, extensor tendon partially visualized, appears intact through ROM, cannot entirely visualize tendon, tendon function normal, cap refill brisk, finger wwp, sensation normal. no foreign body on thorough inspection and palpation in bloodless field. GEN: NAD  Skin: Normal color and turgor.   MSK: 2.5cm linear laceration over L index finger proximal phalanx, extensor tendon partially visualized, with laceration running longitudinally, not transecting the tendon, tendon function normal, cap refill brisk, finger wwp, sensation normal. no foreign body on thorough inspection and palpation in bloodless field.

## 2021-10-06 NOTE — ED PROVIDER NOTE - NSFOLLOWUPINSTRUCTIONS_ED_ALL_ED_FT
Keep the finger splinted.  Rest and elevate the hand.  Take the antibiotic as prescribed for 10 days.  Follow up with DR Lorenzo next week - call in the morning today to schedule.  Return to the Emergency Department if you have any new or worsening symptoms, or if you have any concerns.  =====================    Tendon Laceration    WHAT YOU NEED TO KNOW:    A tendon laceration is a tear or break in your tendon. A tendon is a string of tissue that connects muscle to bone. Tendons help muscles make bones move. A tendon laceration may be caused by too much pressure or force to a joint or body part. It may also be caused by deep cut. A tendon laceration is most commonly caused by deep cut to your hand, finger, wrist, foot, or toes.    DISCHARGE INSTRUCTIONS:    Return to the emergency department if:   •Blood soaks through your bandage.      •Your stitches come apart.      •Your hand, fingers, foot, or toes closest to your injury are pale, numb, or cold.      •You hear or feel a sudden snap, pop, or crack where your tendon is lacerated.      •You have sudden severe pain where your tendon is lacerated.      Call your doctor if:   •You have a fever.      •Your wound is swollen, red, or draining pus.      •Your pain does not get better after you take your medicine or gets worse.      •Your splint or cast falls off.      •You have questions or concerns about your condition or care.      Medicines: You may need any of the following:  •Prescription pain medicine may be given. Ask your healthcare provider how to take this medicine safely. Some prescription pain medicines contain acetaminophen. Do not take other medicines that contain acetaminophen without talking to your healthcare provider. Too much acetaminophen may cause liver damage. Prescription pain medicine may cause constipation. Ask your healthcare provider how to prevent or treat constipation.       •Antibiotics help prevent a bacterial infection.      •NSAIDs, such as ibuprofen, help decrease swelling, pain, and fever. This medicine is available with or without a doctor's order. NSAIDs can cause stomach bleeding or kidney problems in certain people. If you take blood thinner medicine, always ask your healthcare provider if NSAIDs are safe for you. Always read the medicine label and follow directions.      •Take your medicine as directed. Contact your healthcare provider if you think your medicine is not helping or if you have side effects. Tell him of her if you are allergic to any medicine. Keep a list of the medicines, vitamins, and herbs you take. Include the amounts, and when and why you take them. Bring the list or the pill bottles to follow-up visits. Carry your medicine list with you in case of an emergency.      Self-care:    R.I.C.E.     •Rest your arm or leg to help it heal.      •Apply ice on your arm or leg for 15 to 20 minutes every hour or as directed. Use an ice pack, or put crushed ice in a plastic bag. Cover it with a towel. Ice helps prevent tissue damage and decreases swelling and pain.      •Elevate your arm or leg above the level of your heart as often as you can. This will help decrease swelling and pain. Prop your injured body part on pillows or blankets to keep it elevated comfortably.      •Wear your splint as directed. A splint will keep your tendon straight and prevent movement. This will help your tendon heal. Check your skin under your splint for redness, swelling or open skin. You may need to change your splint when it gets wet or dirty. Ask your healthcare provider how to change your splint.      •Perform range of motion exercises (ROM) as directed. ROM exercises are gentle movements of your joint. ROM exercises will prevent stiffness in your arm or leg and help build strength. Do not do ROM exercises unless your healthcare provider says it is okay.      If you or your child has a cast: Do not get your cast wet or put pressure on your cast. Do not put sharp items under your cast to scratch your skin. Ask your healthcare provider for more information on how to care for your cast.    Care for your wound as directed: Ask your healthcare provider when your wound can get wet. Carefully wash around the wound with soap and water. Let soap and water run over your wound. Dry the area and put on new, clean bandages as directed. Change your bandages when they get wet or dirty. Check your wound every day for redness, swelling, or pus.    Follow up with your doctor as directed: You may need to return to have stitches removed from your skin. Write down your questions so you remember to ask them during your visits.

## 2021-10-06 NOTE — ED PROVIDER NOTE - CARE PROVIDER_API CALL
Oscar Lorenzo)  [facility]  [address], [address] [place] [address]  Phone: [phone]  Fax: [phone])-  Follow Up Time: 4-6 Days

## 2021-10-06 NOTE — ED ADULT NURSE NOTE - OBJECTIVE STATEMENT
Pt is a 24 y/o female A&Ox4 in NAD ambulatory with steady gait c/o laceration to left second finger "from cut on wine bottle." Bleeding controlled with dressing, TDAP UTD.

## 2021-10-06 NOTE — ED PROVIDER NOTE - PATIENT PORTAL LINK FT
You can access the FollowMyHealth Patient Portal offered by [facility] by registering at the following website: [email or web address]. By joining [facility]’s FollowMyHealth portal, you will also be able to view your health information using other applications (apps) compatible with our system.

## 2021-10-07 VITALS
TEMPERATURE: 98 F | RESPIRATION RATE: 17 BRPM | DIASTOLIC BLOOD PRESSURE: 68 MMHG | OXYGEN SATURATION: 100 % | SYSTOLIC BLOOD PRESSURE: 128 MMHG | HEART RATE: 77 BPM

## 2021-10-07 RX ORDER — CEPHALEXIN 500 MG
1 CAPSULE ORAL
Qty: 20 | Refills: 0
Start: 2021-10-07 | End: 2021-10-16

## 2021-10-07 NOTE — PROGRESS NOTE ADULT - SUBJECTIVE AND OBJECTIVE BOX
Orthopaedic Surgery Consult Note    For Surgeon: Bj    HPI:  25F healthy presents after a wine glass slipped and cut her hand. Pt sustained a 3 cm deep laceration to the dorsal aspect of her L index proximal phalanx. Denies any numbness/tingling. Denies any weakness. Pt able to flex and extend digits, make a fist. Tetanus up to date. Denies any injury elsewhere.     Vital Signs Last 24 Hrs  T(C): 36.7 (07 Oct 2021 02:06), Max: 36.7 (06 Oct 2021 21:11)  T(F): 98 (07 Oct 2021 02:06), Max: 98.1 (06 Oct 2021 21:11)  HR: 77 (07 Oct 2021 02:06) (77 - 87)  BP: 128/68 (07 Oct 2021 02:06) (128/68 - 133/86)  BP(mean): --  RR: 17 (07 Oct 2021 02:06) (17 - 17)  SpO2: 100% (07 Oct 2021 02:06) (100% - 100%)    Physical Exam:  General: AAOx3, NAD  L Hand: 3 cm longitudinal laceration over dorsal proximal phalanx of 2nd digit  Longitudinal/oblique laceration to the extensor tendon below  Motor intact AIN/PIN/U  SILT grossly M/R/U distributions, radial and ulnar sides of digit  Active extension of digits in unision (EDC) as well as individually (EIP) without extensor lag  Able to extend against resistance however limited to pain  Full flexion of digit does not elicit gapping of tendon    Imaging: XR negative for fx or foreign body    A/P: 25yFemale with Zone 4 extensor tendon laceration to L 2nd digit  - Copiously irrigated and washed out at bedside with betadine/saline solution  - Pt sustained partial longitudinal/oblique laceration to extensor mechanism of 2nd digit  - Extensor mechanism of EDC and EIP tendon grossly intact  - Neurovascularly intact  - Skin primarily closed with 4-0 nylon sutures  - Xeroform/soft dressing/extension splint  - Keflex x 7 days  - F/u with Dr. Lorenzo in office within 1 week    Rosalinda Aguillon, PGY-2  Orthopedic Surgery

## 2021-10-13 ENCOUNTER — TRANSCRIPTION ENCOUNTER (OUTPATIENT)
Age: 26
End: 2021-10-13

## 2021-10-13 ENCOUNTER — APPOINTMENT (OUTPATIENT)
Dept: ORTHOPEDIC SURGERY | Facility: CLINIC | Age: 26
End: 2021-10-13
Payer: COMMERCIAL

## 2021-10-13 VITALS — HEIGHT: 66 IN | WEIGHT: 190 LBS | BODY MASS INDEX: 30.53 KG/M2 | RESPIRATION RATE: 16 BRPM

## 2021-10-13 DIAGNOSIS — Z78.9 OTHER SPECIFIED HEALTH STATUS: ICD-10-CM

## 2021-10-13 DIAGNOSIS — Z80.9 FAMILY HISTORY OF MALIGNANT NEOPLASM, UNSPECIFIED: ICD-10-CM

## 2021-10-13 PROBLEM — Z00.00 ENCOUNTER FOR PREVENTIVE HEALTH EXAMINATION: Status: ACTIVE | Noted: 2021-10-13

## 2021-10-13 PROCEDURE — 99204 OFFICE O/P NEW MOD 45 MIN: CPT

## 2021-10-13 RX ORDER — CEPHALEXIN 750 MG/1
CAPSULE ORAL
Refills: 0 | Status: ACTIVE | COMMUNITY

## 2021-10-13 RX ORDER — SUMATRIPTAN 50 MG/1
TABLET, FILM COATED ORAL
Refills: 0 | Status: ACTIVE | COMMUNITY

## 2021-10-13 RX ORDER — TOPIRAMATE 25 MG
CAPSULE, SPRINKLE ORAL
Refills: 0 | Status: ACTIVE | COMMUNITY

## 2021-10-15 RX ORDER — ACETAMINOPHEN AND CODEINE 300; 30 MG/1; MG/1
300-30 TABLET ORAL 4 TIMES DAILY
Qty: 20 | Refills: 0 | Status: ACTIVE | COMMUNITY
Start: 2021-10-15 | End: 1900-01-01

## 2021-10-16 ENCOUNTER — LABORATORY RESULT (OUTPATIENT)
Age: 26
End: 2021-10-16

## 2021-10-18 ENCOUNTER — TRANSCRIPTION ENCOUNTER (OUTPATIENT)
Age: 26
End: 2021-10-18

## 2021-10-18 ENCOUNTER — APPOINTMENT (OUTPATIENT)
Dept: ORTHOPEDIC SURGERY | Facility: AMBULATORY SURGERY CENTER | Age: 26
End: 2021-10-18
Payer: COMMERCIAL

## 2021-10-18 PROCEDURE — 26418 REPAIR FINGER TENDON: CPT

## 2021-10-28 ENCOUNTER — APPOINTMENT (OUTPATIENT)
Dept: ORTHOPEDIC SURGERY | Facility: CLINIC | Age: 26
End: 2021-10-28
Payer: COMMERCIAL

## 2021-10-28 PROBLEM — Z78.9 OTHER SPECIFIED HEALTH STATUS: Chronic | Status: ACTIVE | Noted: 2021-10-18

## 2021-10-28 PROCEDURE — 99024 POSTOP FOLLOW-UP VISIT: CPT

## 2021-11-10 ENCOUNTER — NON-APPOINTMENT (OUTPATIENT)
Age: 26
End: 2021-11-10

## 2021-11-30 ENCOUNTER — APPOINTMENT (OUTPATIENT)
Dept: ORTHOPEDIC SURGERY | Facility: CLINIC | Age: 26
End: 2021-11-30
Payer: COMMERCIAL

## 2021-11-30 PROCEDURE — 99024 POSTOP FOLLOW-UP VISIT: CPT

## 2022-01-11 ENCOUNTER — APPOINTMENT (OUTPATIENT)
Dept: ORTHOPEDIC SURGERY | Facility: CLINIC | Age: 27
End: 2022-01-11
Payer: COMMERCIAL

## 2022-01-11 PROCEDURE — 99024 POSTOP FOLLOW-UP VISIT: CPT

## 2022-04-07 ENCOUNTER — APPOINTMENT (OUTPATIENT)
Dept: ORTHOPEDIC SURGERY | Facility: CLINIC | Age: 27
End: 2022-04-07
Payer: COMMERCIAL

## 2022-04-07 DIAGNOSIS — S61.219A LACERATION W/OUT FOREIGN BODY OF UNSPECIFIED FINGER W/OUT DAMAGE TO NAIL, INITIAL ENCOUNTER: ICD-10-CM

## 2022-04-07 PROCEDURE — 99213 OFFICE O/P EST LOW 20 MIN: CPT

## 2022-04-07 PROCEDURE — 73140 X-RAY EXAM OF FINGER(S): CPT | Mod: F1
